# Patient Record
Sex: FEMALE | Employment: OTHER | URBAN - METROPOLITAN AREA
[De-identification: names, ages, dates, MRNs, and addresses within clinical notes are randomized per-mention and may not be internally consistent; named-entity substitution may affect disease eponyms.]

---

## 2023-10-05 PROBLEM — I50.22 CHRONIC SYSTOLIC CHF (CONGESTIVE HEART FAILURE), NYHA CLASS 1 (MULTI): Status: ACTIVE | Noted: 2023-10-05

## 2023-10-05 PROBLEM — I25.10 CORONARY ARTERY DISEASE: Status: ACTIVE | Noted: 2023-10-05

## 2023-10-05 PROBLEM — R05.8 PRODUCTIVE COUGH: Status: ACTIVE | Noted: 2023-10-05

## 2023-10-05 PROBLEM — I82.409 ACUTE DVT (DEEP VENOUS THROMBOSIS) (MULTI): Status: ACTIVE | Noted: 2023-10-05

## 2023-10-05 PROBLEM — S29.012A UPPER BACK STRAIN: Status: ACTIVE | Noted: 2023-10-05

## 2023-10-05 PROBLEM — G62.9 PN (PERIPHERAL NEUROPATHY): Status: ACTIVE | Noted: 2023-10-05

## 2023-10-05 PROBLEM — I25.2 OLD MYOCARDIAL INFARCTION: Status: ACTIVE | Noted: 2023-10-05

## 2023-10-05 PROBLEM — I46.9 CARDIAC ARREST WITH SUCCESSFUL RESUSCITATION (MULTI): Status: ACTIVE | Noted: 2023-10-05

## 2023-10-05 PROBLEM — R53.83 FATIGUE: Status: ACTIVE | Noted: 2023-10-05

## 2023-10-05 PROBLEM — I48.0 PAROXYSMAL ATRIAL FIBRILLATION (MULTI): Status: ACTIVE | Noted: 2023-10-05

## 2023-10-05 PROBLEM — I25.5 ISCHEMIC CARDIOMYOPATHY: Status: ACTIVE | Noted: 2023-10-05

## 2023-10-05 PROBLEM — M54.9 UPPER BACK PAIN: Status: ACTIVE | Noted: 2023-10-05

## 2023-10-05 PROBLEM — R07.89 CHEST TIGHTNESS: Status: ACTIVE | Noted: 2023-10-05

## 2023-10-05 PROBLEM — R06.09 EXERTIONAL DYSPNEA: Status: ACTIVE | Noted: 2023-10-05

## 2023-10-05 PROBLEM — E78.5 HYPERLIPIDEMIA: Status: ACTIVE | Noted: 2023-10-05

## 2023-10-05 RX ORDER — CLOPIDOGREL BISULFATE 75 MG/1
1 TABLET ORAL DAILY
COMMUNITY
Start: 2021-07-07 | End: 2023-12-06 | Stop reason: SDUPTHER

## 2023-10-05 RX ORDER — MONTELUKAST SODIUM 10 MG/1
1 TABLET ORAL DAILY
COMMUNITY

## 2023-10-05 RX ORDER — TORSEMIDE 5 MG/1
1 TABLET ORAL DAILY
COMMUNITY
Start: 2022-08-29 | End: 2024-05-03

## 2023-10-05 RX ORDER — ZINC SULFATE 50(220)MG
CAPSULE ORAL
COMMUNITY

## 2023-10-05 RX ORDER — OMEPRAZOLE 20 MG/1
20 CAPSULE, DELAYED RELEASE ORAL DAILY
COMMUNITY

## 2023-10-05 RX ORDER — FLUTICASONE PROPIONATE 50 MCG
SPRAY, SUSPENSION (ML) NASAL
COMMUNITY

## 2023-10-05 RX ORDER — METOPROLOL SUCCINATE 25 MG/1
1 TABLET, EXTENDED RELEASE ORAL DAILY
COMMUNITY
Start: 2022-04-28 | End: 2024-05-31

## 2023-10-05 RX ORDER — LOSARTAN POTASSIUM 25 MG/1
1 TABLET ORAL DAILY
COMMUNITY
Start: 2021-07-07 | End: 2024-06-10

## 2023-10-05 RX ORDER — SERTRALINE HYDROCHLORIDE 25 MG/1
1 TABLET, FILM COATED ORAL DAILY
COMMUNITY
Start: 2021-02-24 | End: 2024-04-10

## 2023-10-05 RX ORDER — ATORVASTATIN CALCIUM 40 MG/1
1 TABLET, FILM COATED ORAL DAILY
COMMUNITY
Start: 2021-07-07

## 2023-11-02 ENCOUNTER — OFFICE VISIT (OUTPATIENT)
Dept: CARDIOLOGY | Facility: CLINIC | Age: 68
End: 2023-11-02
Payer: MEDICARE

## 2023-11-02 ENCOUNTER — HOSPITAL ENCOUNTER (OUTPATIENT)
Dept: VASCULAR MEDICINE | Facility: CLINIC | Age: 68
Discharge: HOME | End: 2023-11-02
Payer: MEDICARE

## 2023-11-02 VITALS
BODY MASS INDEX: 28.85 KG/M2 | DIASTOLIC BLOOD PRESSURE: 60 MMHG | HEIGHT: 64 IN | OXYGEN SATURATION: 97 % | SYSTOLIC BLOOD PRESSURE: 100 MMHG | WEIGHT: 169 LBS | HEART RATE: 75 BPM

## 2023-11-02 DIAGNOSIS — I73.9 PERIPHERAL VASCULAR DISEASE, UNSPECIFIED (CMS-HCC): ICD-10-CM

## 2023-11-02 DIAGNOSIS — R06.09 EXERTIONAL DYSPNEA: ICD-10-CM

## 2023-11-02 DIAGNOSIS — E78.2 MIXED HYPERLIPIDEMIA: ICD-10-CM

## 2023-11-02 DIAGNOSIS — I46.9 CARDIAC ARREST WITH SUCCESSFUL RESUSCITATION (MULTI): Primary | ICD-10-CM

## 2023-11-02 DIAGNOSIS — R07.89 CHEST TIGHTNESS: ICD-10-CM

## 2023-11-02 DIAGNOSIS — I50.22 CHRONIC SYSTOLIC CHF (CONGESTIVE HEART FAILURE), NYHA CLASS 1 (MULTI): ICD-10-CM

## 2023-11-02 DIAGNOSIS — I25.10 CORONARY ARTERY DISEASE INVOLVING NATIVE CORONARY ARTERY OF NATIVE HEART WITHOUT ANGINA PECTORIS: ICD-10-CM

## 2023-11-02 DIAGNOSIS — I48.0 PAROXYSMAL ATRIAL FIBRILLATION (MULTI): ICD-10-CM

## 2023-11-02 DIAGNOSIS — I25.2 OLD MYOCARDIAL INFARCTION: ICD-10-CM

## 2023-11-02 DIAGNOSIS — I25.5 ISCHEMIC CARDIOMYOPATHY: ICD-10-CM

## 2023-11-02 PROCEDURE — 1036F TOBACCO NON-USER: CPT | Performed by: INTERNAL MEDICINE

## 2023-11-02 PROCEDURE — 93924 LWR XTR VASC STDY BILAT: CPT | Performed by: SURGERY

## 2023-11-02 PROCEDURE — 99214 OFFICE O/P EST MOD 30 MIN: CPT | Performed by: INTERNAL MEDICINE

## 2023-11-02 PROCEDURE — 93924 LWR XTR VASC STDY BILAT: CPT

## 2023-11-02 PROCEDURE — 1159F MED LIST DOCD IN RCRD: CPT | Performed by: INTERNAL MEDICINE

## 2023-11-02 RX ORDER — FLUTICASONE PROPIONATE AND SALMETEROL 100; 50 UG/1; UG/1
1 POWDER RESPIRATORY (INHALATION)
COMMUNITY

## 2023-11-02 RX ORDER — BENZONATATE 100 MG/1
100 CAPSULE ORAL DAILY
COMMUNITY

## 2023-11-02 ASSESSMENT — ENCOUNTER SYMPTOMS: DYSPNEA ON EXERTION: 1

## 2023-11-02 NOTE — PATIENT INSTRUCTIONS
We think that you have carpel tunnel syndrome.  See Dr. Domenic Cortes  [facility]. [phone]    Your vascular study is normal.

## 2023-11-02 NOTE — PROGRESS NOTES
Subjective   Mary Beth Serrato is a 68 y.o. female.    Chief Complaint:  Follow-up congestive heart failure, coronary artery disease.  Exertional dyspnea.    HPI    Over the past 6 months she has felt fairly well.  She notes shortness of breath and dyspnea with exertion.  Does not get any anginal symptoms.  Is quite vigorous and active.  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  Is active throughout the day.  Gets tightness in a band across the chest area.  This is relieved by removing her bra.  She has had no further emergency room visits for shortness of breath since her last visit.  She does complain of discomfort in her lower extremities.    The patient presented emergently on October 22, 2019 with an acute myocardial infarction. This was complicated by cardiac arrest and fairly prolonged resuscitation. She underwent angioplasty and stenting of the left anterior descending and circumflex coronary arteries. Both thrombotic occlusions. The procedures were done under Impella support. Follow-up echocardiographic studies demonstrated an ejection fraction of 35%. She had a slow prolonged postoperative course secondary to some neurologic issues and congestive heart failure and ventricular arrhythmias. At the time of her discharge she was started on beta blockers, angiotensin receptor blockers, and amiodarone. She also had paroxysmal atrial fibrillation. She had evidence of a deep venous thrombosis.     A nuclear stress thallium study in July 2020 showed an ejection fraction of 39%.     Her past cardiac history is significant for hypertension, hyperlipidemia, and a positive family history of heart issues. In 2014 she had a DVT.     Allergies  Medication    · No Known Drug Allergies   Recorded By: Celia Woods; 8/26/2016 4:58:20 PM     Family History  Mother    · Family history of congestive heart failure (V17.49) (Z82.49)  Maternal Grandmother    · Family history of cardiac disorder (V17.49) (Z82.49)     Social History  Problems  "   · Does not use illicit drugs (V49.89) (Z78.9)   · Never a smoker   · No alcohol use      Review of Systems   Cardiovascular:  Positive for dyspnea on exertion.   All other systems reviewed and are negative.      Visit Vitals  /60 (BP Location: Left arm, Patient Position: Sitting, BP Cuff Size: Adult)   Pulse 75   Ht 1.626 m (5' 4\")   Wt 76.7 kg (169 lb)   SpO2 97%   BMI 29.01 kg/m²   Smoking Status Never   BSA 1.86 m²        Objective     Constitutional:       Appearance: Not in distress.   Neck:      Vascular: JVD normal.   Pulmonary:      Breath sounds: Normal breath sounds.   Cardiovascular:      Normal rate. Regular rhythm. Normal S1. Normal S2.       Murmurs: There is no murmur.      No gallop.    Pulses:     Intact distal pulses.   Edema:     Peripheral edema absent.   Abdominal:      General: There is no distension.      Palpations: Abdomen is soft.   Neurological:      Mental Status: Alert.         Lab Review:     Assessment:    1.  Coronary artery disease.  Status post extensive myocardial infarction in October 2019.  She has had a remarkably good course.  Has not had recurrence of her symptoms.  Her most recent stress thallium study shows a left ventricular ejection fraction of 41% with fixed defects.  We will continue medical management.  The patient's chest tightness symptoms are atypical for angina.    2.  Hypertension.  Blood pressures are low.    3.  Hyperlipidemia.  Patient is on statin therapy.  She is on atorvastatin 40 mg daily.  Most recent LDL was 70.    4.  Systolic congestive heart failure.  No heart failure by exam.  Oxygen saturation is 98%.  Lungs are clear.    5.  Leg pains.  Felt to be claudication.  However PVR studies are normal.  "

## 2023-12-06 DIAGNOSIS — I25.10 CORONARY ARTERY DISEASE INVOLVING NATIVE CORONARY ARTERY OF NATIVE HEART WITHOUT ANGINA PECTORIS: Primary | ICD-10-CM

## 2023-12-07 RX ORDER — CLOPIDOGREL BISULFATE 75 MG/1
75 TABLET ORAL DAILY
Qty: 90 TABLET | Refills: 3 | Status: SHIPPED | OUTPATIENT
Start: 2023-12-07

## 2024-01-22 DIAGNOSIS — I48.0 PAROXYSMAL ATRIAL FIBRILLATION (MULTI): Primary | ICD-10-CM

## 2024-01-23 ENCOUNTER — TELEPHONE (OUTPATIENT)
Dept: CARDIOLOGY | Facility: CLINIC | Age: 69
End: 2024-01-23
Payer: MEDICARE

## 2024-01-23 DIAGNOSIS — I48.0 PAROXYSMAL ATRIAL FIBRILLATION (MULTI): Primary | ICD-10-CM

## 2024-01-23 NOTE — TELEPHONE ENCOUNTER
Pt called for refill for Xarelto 10mg daily and asking for samples because she doesn't have enough until mail order sends shipment. New rx to be sent to [facility] and will provide pt with Xarelto 10mg samples.

## 2024-01-24 DIAGNOSIS — I48.0 PAROXYSMAL ATRIAL FIBRILLATION (MULTI): ICD-10-CM

## 2024-04-10 DIAGNOSIS — F41.9 ANXIETY: ICD-10-CM

## 2024-04-10 RX ORDER — SERTRALINE HYDROCHLORIDE 25 MG/1
25 TABLET, FILM COATED ORAL DAILY
Qty: 90 TABLET | Refills: 1 | Status: SHIPPED | OUTPATIENT
Start: 2024-04-10

## 2024-04-23 PROBLEM — I83.813 VARICOSE VEINS OF BOTH LOWER EXTREMITIES WITH PAIN: Status: ACTIVE | Noted: 2024-04-23

## 2024-04-23 PROBLEM — I10 HYPERTENSION: Status: ACTIVE | Noted: 2024-04-23

## 2024-04-23 PROBLEM — M81.0 OSTEOPOROSIS: Status: ACTIVE | Noted: 2023-09-18

## 2024-04-23 PROBLEM — I26.99 PULMONARY EMBOLISM (MULTI): Status: ACTIVE | Noted: 2024-04-23

## 2024-04-23 PROBLEM — I25.2 HX OF MYOCARDIAL INFARCTION: Status: ACTIVE | Noted: 2024-04-23

## 2024-05-03 DIAGNOSIS — I10 PRIMARY HYPERTENSION: ICD-10-CM

## 2024-05-03 RX ORDER — TORSEMIDE 5 MG/1
5 TABLET ORAL DAILY
Qty: 90 TABLET | Refills: 3 | Status: SHIPPED | OUTPATIENT
Start: 2024-05-03

## 2024-05-15 ENCOUNTER — OFFICE VISIT (OUTPATIENT)
Dept: CARDIOLOGY | Facility: CLINIC | Age: 69
End: 2024-05-15
Payer: MEDICARE

## 2024-05-15 VITALS
HEART RATE: 83 BPM | DIASTOLIC BLOOD PRESSURE: 66 MMHG | HEIGHT: 64 IN | SYSTOLIC BLOOD PRESSURE: 126 MMHG | BODY MASS INDEX: 29.88 KG/M2 | OXYGEN SATURATION: 94 % | WEIGHT: 175 LBS

## 2024-05-15 DIAGNOSIS — E78.2 MIXED HYPERLIPIDEMIA: ICD-10-CM

## 2024-05-15 DIAGNOSIS — I83.813 VARICOSE VEINS OF BOTH LOWER EXTREMITIES WITH PAIN: ICD-10-CM

## 2024-05-15 DIAGNOSIS — I50.22 CHRONIC SYSTOLIC CHF (CONGESTIVE HEART FAILURE), NYHA CLASS 1 (MULTI): ICD-10-CM

## 2024-05-15 DIAGNOSIS — I48.0 PAROXYSMAL ATRIAL FIBRILLATION (MULTI): Primary | ICD-10-CM

## 2024-05-15 DIAGNOSIS — I46.9 CARDIAC ARREST WITH SUCCESSFUL RESUSCITATION (MULTI): ICD-10-CM

## 2024-05-15 DIAGNOSIS — I25.2 OLD MYOCARDIAL INFARCTION: ICD-10-CM

## 2024-05-15 DIAGNOSIS — I25.5 ISCHEMIC CARDIOMYOPATHY: ICD-10-CM

## 2024-05-15 DIAGNOSIS — I25.10 CORONARY ARTERY DISEASE INVOLVING NATIVE CORONARY ARTERY OF NATIVE HEART WITHOUT ANGINA PECTORIS: ICD-10-CM

## 2024-05-15 DIAGNOSIS — R06.09 EXERTIONAL DYSPNEA: ICD-10-CM

## 2024-05-15 DIAGNOSIS — I10 PRIMARY HYPERTENSION: ICD-10-CM

## 2024-05-15 PROBLEM — R07.89 CHEST TIGHTNESS: Status: RESOLVED | Noted: 2023-10-05 | Resolved: 2024-05-15

## 2024-05-15 PROCEDURE — 1036F TOBACCO NON-USER: CPT | Performed by: INTERNAL MEDICINE

## 2024-05-15 PROCEDURE — 3074F SYST BP LT 130 MM HG: CPT | Performed by: INTERNAL MEDICINE

## 2024-05-15 PROCEDURE — 99214 OFFICE O/P EST MOD 30 MIN: CPT | Performed by: INTERNAL MEDICINE

## 2024-05-15 PROCEDURE — 3078F DIAST BP <80 MM HG: CPT | Performed by: INTERNAL MEDICINE

## 2024-05-15 PROCEDURE — 1159F MED LIST DOCD IN RCRD: CPT | Performed by: INTERNAL MEDICINE

## 2024-05-15 PROCEDURE — 93000 ELECTROCARDIOGRAM COMPLETE: CPT | Performed by: INTERNAL MEDICINE

## 2024-05-15 RX ORDER — ROSUVASTATIN CALCIUM 40 MG/1
40 TABLET, COATED ORAL DAILY
Qty: 90 TABLET | Refills: 3 | Status: SHIPPED | OUTPATIENT
Start: 2024-05-15 | End: 2025-05-15

## 2024-05-15 ASSESSMENT — ENCOUNTER SYMPTOMS: DYSPNEA ON EXERTION: 1

## 2024-05-15 NOTE — PROGRESS NOTES
Subjective   Mary Beth Serrato is a 68 y.o. female.    Chief Complaint:  Follow-up coronary artery disease.    HPI    She continues to be very active.  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  The bandlike sensation that she previously described is no longer present.  Also followed by primary care and followed by pulmonary service.  On anticoagulation therapy because of a history of DVT.  No further medical episodes or emergency room visits.    The patient presented emergently on October 22, 2019 with an acute myocardial infarction. This was complicated by cardiac arrest and fairly prolonged resuscitation. She underwent angioplasty and stenting of the left anterior descending and circumflex coronary arteries. Both thrombotic occlusions. The procedures were done under Impella support. Follow-up echocardiographic studies demonstrated an ejection fraction of 35%. She had a slow prolonged postoperative course secondary to some neurologic issues and congestive heart failure and ventricular arrhythmias. At the time of her discharge she was started on beta blockers, angiotensin receptor blockers, and amiodarone. She also had paroxysmal atrial fibrillation. She had evidence of a deep venous thrombosis.     A nuclear stress thallium study in July 2020 showed an ejection fraction of 39%.     Her past cardiac history is significant for hypertension, hyperlipidemia, and a positive family history of heart issues. In 2014 she had a DVT.      Allergies  Medication    · No Known Drug Allergies   Recorded By: Celia Woods; 8/26/2016 4:58:20 PM     Family History  Mother    · Family history of congestive heart failure (V17.49) (Z82.49)  Maternal Grandmother    · Family history of cardiac disorder (V17.49) (Z82.49)     Social History  Problems    · Does not use illicit drugs (V49.89) (Z78.9)   · Never a smoker   · No alcohol use    Review of Systems   Cardiovascular:  Positive for dyspnea on exertion.   All other systems reviewed  "and are negative.      Current Outpatient Medications   Medication Sig Dispense Refill    atorvastatin (Lipitor) 40 mg tablet Take 1 tablet (40 mg) by mouth once daily.      cholecalciferol, vitamin D3, (VITAMIN D3 ORAL) Take by mouth.      clopidogrel (Plavix) 75 mg tablet Take 1 tablet (75 mg) by mouth once daily. 90 tablet 3    fluticasone (Flonase) 50 mcg/actuation nasal spray Administer into affected nostril(s).      fluticasone propion-salmeteroL (Advair Diskus) 100-50 mcg/dose diskus inhaler Inhale 1 puff 2 times a day. Rinse mouth with water after use to reduce aftertaste and incidence of candidiasis. Do not swallow.      glutamine 500 mg capsule Take by mouth.      losartan (Cozaar) 25 mg tablet Take 1 tablet (25 mg) by mouth once daily.      metoprolol succinate XL (Toprol-XL) 25 mg 24 hr tablet Take 1 tablet (25 mg) by mouth once daily.      montelukast (Singulair) 10 mg tablet Take 1 tablet (10 mg) by mouth once daily.      omeprazole (PriLOSEC) 20 mg DR capsule Take 20 capsules (400 mg) by mouth once daily.      rivaroxaban (Xarelto) 10 mg tablet Take 1 tablet (10 mg) by mouth once daily. 90 tablet 3    sertraline (Zoloft) 25 mg tablet TAKE 1 TABLET DAILY 90 tablet 1    torsemide (Demadex) 5 mg tablet TAKE 1 TABLET DAILY 90 tablet 3    zinc sulfate (Zincate) 220 (50 Zn) MG capsule Take by mouth.      benzonatate (Tessalon) 100 mg capsule Take 1 capsule (100 mg) by mouth once daily. Do not crush or chew.      DENOSUMAB SUBQ Inject under the skin.      rosuvastatin (Crestor) 40 mg tablet Take 1 tablet (40 mg) by mouth once daily. 90 tablet 3     No current facility-administered medications for this visit.        Visit Vitals  /66 (BP Location: Left arm)   Pulse 83   Ht 1.626 m (5' 4\")   Wt 79.4 kg (175 lb)   SpO2 94%   BMI 30.04 kg/m²   Smoking Status Never   BSA 1.89 m²        Objective     Constitutional:       Appearance: Not in distress.   Neck:      Vascular: JVD normal.   Pulmonary:      " Breath sounds: Normal breath sounds.   Cardiovascular:      Normal rate. Regular rhythm. Normal S1. Normal S2.       Murmurs: There is no murmur.      No gallop.    Pulses:     Intact distal pulses.   Edema:     Peripheral edema absent.   Abdominal:      General: There is no distension.      Palpations: Abdomen is soft.   Neurological:      Mental Status: Alert.       Assessment:    1.  Coronary disease.  Status post angioplasty and stent placement in October 2019.  Procedure was complicated by cardiac arrest.  Has some slight discomfort in the upper sternal area which is reproducible with palpation.  Latest stress time study was negative for ischemia.    2.  Systolic congestive heart failure.  Ejection fraction of around 40%.  No heart failure symptoms.  No heart failure by exam.  Oxygen saturation is normal at 98%.  She is on a combination of beta-blocker torsemide and losartan.    3.  Hyperlipidemia.  Most recent labs show a cholesterol 182, HDL 49, LDL 90.  We are going to change her atorvastatin to rosuvastatin.    4.  History of DVT.  On maintenance Xarelto therapy.

## 2024-05-15 NOTE — PATIENT INSTRUCTIONS
Stop atorvastatin 40 mg daily    Start Rosuvastatin 40 mg daily    No changes in your other medications.    Discontinue fish oil  Discontinue Zinc.

## 2024-05-31 DIAGNOSIS — I10 PRIMARY HYPERTENSION: ICD-10-CM

## 2024-05-31 RX ORDER — METOPROLOL SUCCINATE 25 MG/1
25 TABLET, EXTENDED RELEASE ORAL DAILY
Qty: 90 TABLET | Refills: 3 | Status: SHIPPED | OUTPATIENT
Start: 2024-05-31

## 2024-06-08 DIAGNOSIS — I10 PRIMARY HYPERTENSION: ICD-10-CM

## 2024-06-10 RX ORDER — LOSARTAN POTASSIUM 25 MG/1
25 TABLET ORAL DAILY
Qty: 90 TABLET | Refills: 3 | Status: SHIPPED | OUTPATIENT
Start: 2024-06-10

## 2024-08-23 DIAGNOSIS — F41.9 ANXIETY: ICD-10-CM

## 2024-08-23 RX ORDER — SERTRALINE HYDROCHLORIDE 25 MG/1
25 TABLET, FILM COATED ORAL DAILY
Qty: 90 TABLET | Refills: 2 | Status: SHIPPED | OUTPATIENT
Start: 2024-08-23

## 2024-10-21 DIAGNOSIS — I25.10 CORONARY ARTERY DISEASE INVOLVING NATIVE CORONARY ARTERY OF NATIVE HEART WITHOUT ANGINA PECTORIS: ICD-10-CM

## 2024-10-21 RX ORDER — CLOPIDOGREL BISULFATE 75 MG/1
75 TABLET ORAL DAILY
Qty: 90 TABLET | Refills: 3 | Status: SHIPPED | OUTPATIENT
Start: 2024-10-21

## 2024-10-30 PROBLEM — M19.041 ARTHRITIS OF BOTH HANDS: Status: ACTIVE | Noted: 2024-10-30

## 2024-10-30 PROBLEM — M19.042 ARTHRITIS OF BOTH HANDS: Status: ACTIVE | Noted: 2024-10-30

## 2024-11-18 PROBLEM — I25.2 OLD MYOCARDIAL INFARCTION: Status: RESOLVED | Noted: 2023-10-05 | Resolved: 2024-11-18

## 2024-11-20 ENCOUNTER — APPOINTMENT (OUTPATIENT)
Dept: CARDIOLOGY | Facility: CLINIC | Age: 69
End: 2024-11-20
Payer: MEDICARE

## 2024-11-20 VITALS
OXYGEN SATURATION: 94 % | WEIGHT: 173 LBS | RESPIRATION RATE: 16 BRPM | SYSTOLIC BLOOD PRESSURE: 122 MMHG | BODY MASS INDEX: 29.7 KG/M2 | HEART RATE: 58 BPM | DIASTOLIC BLOOD PRESSURE: 60 MMHG

## 2024-11-20 DIAGNOSIS — E78.2 MIXED HYPERLIPIDEMIA: Primary | ICD-10-CM

## 2024-11-20 DIAGNOSIS — J45.20 MILD INTERMITTENT ASTHMA WITHOUT COMPLICATION (HHS-HCC): ICD-10-CM

## 2024-11-20 DIAGNOSIS — I83.813 VARICOSE VEINS OF BOTH LOWER EXTREMITIES WITH PAIN: ICD-10-CM

## 2024-11-20 DIAGNOSIS — I25.5 ISCHEMIC CARDIOMYOPATHY: ICD-10-CM

## 2024-11-20 DIAGNOSIS — R06.09 EXERTIONAL DYSPNEA: ICD-10-CM

## 2024-11-20 DIAGNOSIS — I46.9 CARDIAC ARREST WITH SUCCESSFUL RESUSCITATION: ICD-10-CM

## 2024-11-20 DIAGNOSIS — I10 PRIMARY HYPERTENSION: ICD-10-CM

## 2024-11-20 DIAGNOSIS — I25.10 CORONARY ARTERY DISEASE INVOLVING NATIVE CORONARY ARTERY OF NATIVE HEART WITHOUT ANGINA PECTORIS: ICD-10-CM

## 2024-11-20 DIAGNOSIS — I27.82 CHRONIC PULMONARY EMBOLISM WITHOUT ACUTE COR PULMONALE, UNSPECIFIED PULMONARY EMBOLISM TYPE (MULTI): ICD-10-CM

## 2024-11-20 DIAGNOSIS — I25.2 HX OF MYOCARDIAL INFARCTION: ICD-10-CM

## 2024-11-20 DIAGNOSIS — I50.22 CHRONIC SYSTOLIC CHF (CONGESTIVE HEART FAILURE), NYHA CLASS 1: ICD-10-CM

## 2024-11-20 DIAGNOSIS — I48.0 PAROXYSMAL ATRIAL FIBRILLATION (MULTI): ICD-10-CM

## 2024-11-20 PROCEDURE — 3078F DIAST BP <80 MM HG: CPT | Performed by: INTERNAL MEDICINE

## 2024-11-20 PROCEDURE — 3074F SYST BP LT 130 MM HG: CPT | Performed by: INTERNAL MEDICINE

## 2024-11-20 PROCEDURE — 1159F MED LIST DOCD IN RCRD: CPT | Performed by: INTERNAL MEDICINE

## 2024-11-20 PROCEDURE — 99214 OFFICE O/P EST MOD 30 MIN: CPT | Performed by: INTERNAL MEDICINE

## 2024-11-20 RX ORDER — IBANDRONATE SODIUM 150 MG/1
150 TABLET, FILM COATED ORAL
COMMUNITY
Start: 2024-09-09

## 2024-11-20 RX ORDER — EZETIMIBE 10 MG/1
10 TABLET ORAL DAILY
Qty: 90 TABLET | Refills: 3 | Status: SHIPPED | OUTPATIENT
Start: 2024-11-20 | End: 2025-11-20

## 2024-11-20 RX ORDER — MONTELUKAST SODIUM 10 MG/1
10 TABLET ORAL DAILY
Qty: 90 TABLET | Refills: 3 | Status: SHIPPED | OUTPATIENT
Start: 2024-11-20 | End: 2025-11-20

## 2024-11-20 NOTE — PROGRESS NOTES
Subjective   Mary Beth Serrato is a 69 y.o. female.    Chief Complaint:  Follow-up coronary disease, hypertension, hyperlipidemia.    HPI    She has had no cardiac symptoms or issues since her last visit.  Denies any chest pain or shortness of breath.  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  Has a history of DVT.  History of some arthritis issues seen by the orthopedic surgery service.    The patient presented emergently on October 22, 2019 with an acute myocardial infarction. This was complicated by cardiac arrest and fairly prolonged resuscitation. She underwent angioplasty and stenting of the left anterior descending and circumflex coronary arteries. Both thrombotic occlusions. The procedures were done under Impella support. Follow-up echocardiographic studies demonstrated an ejection fraction of 35%. She had a slow prolonged postoperative course secondary to some neurologic issues and congestive heart failure and ventricular arrhythmias. At the time of her discharge she was started on beta blockers, angiotensin receptor blockers, and amiodarone. She also had paroxysmal atrial fibrillation. She had evidence of a deep venous thrombosis.     A nuclear stress thallium study in July 2020 showed an ejection fraction of 39%.     Her past cardiac history is significant for hypertension, hyperlipidemia, and a positive family history of heart issues. In 2014 she had a DVT.      Allergies  Medication    · No Known Drug Allergies   Recorded By: Celia Woods; 8/26/2016 4:58:20 PM     Family History  Mother    · Family history of congestive heart failure (V17.49) (Z82.49)  Maternal Grandmother    · Family history of cardiac disorder (V17.49) (Z82.49)     Social History  Problems    · Never a smoker   · No alcohol use     Review of Systems   Cardiovascular:  Positive for dyspnea on exertion.   All other systems reviewed and are negative.    Current Outpatient Medications   Medication Sig Dispense  Refill    atorvastatin (Lipitor) 40 mg tablet Take 1 tablet (40 mg) by mouth once daily.      cholecalciferol, vitamin D3, (VITAMIN D3 ORAL) Take by mouth.      clopidogrel (Plavix) 75 mg tablet TAKE 1 TABLET DAILY 90 tablet 3    fluticasone (Flonase) 50 mcg/actuation nasal spray Administer into affected nostril(s).      fluticasone propion-salmeteroL (Advair Diskus) 100-50 mcg/dose diskus inhaler Inhale 1 puff 2 times a day. Rinse mouth with water after use to reduce aftertaste and incidence of candidiasis. Do not swallow.      glutamine 500 mg capsule Take by mouth.      ibandronate (Boniva) 150 mg tablet Take 1 tablet (150 mg) by mouth.      losartan (Cozaar) 25 mg tablet TAKE 1 TABLET DAILY 90 tablet 3    metoprolol succinate XL (Toprol-XL) 25 mg 24 hr tablet TAKE 1 TABLET DAILY 90 tablet 3    omeprazole (PriLOSEC) 20 mg DR capsule Take 20 capsules (400 mg) by mouth once daily.      rosuvastatin (Crestor) 40 mg tablet Take 1 tablet (40 mg) by mouth once daily. 90 tablet 3    sertraline (Zoloft) 25 mg tablet TAKE ONE TABLET BY MOUTH EVERY DAY 90 tablet 2    torsemide (Demadex) 5 mg tablet TAKE 1 TABLET DAILY 90 tablet 3    zinc sulfate (Zincate) 220 (50 Zn) MG capsule Take by mouth.      benzonatate (Tessalon) 100 mg capsule Take 1 capsule (100 mg) by mouth once daily. Do not crush or chew.      DENOSUMAB SUBQ Inject under the skin.      ezetimibe (Zetia) 10 mg tablet Take 1 tablet (10 mg) by mouth once daily. 90 tablet 3    montelukast (Singulair) 10 mg tablet Take 1 tablet (10 mg) by mouth once daily. 90 tablet 3    rivaroxaban (Xarelto) 10 mg tablet Take 1 tablet (10 mg) by mouth once daily. 90 tablet 3     No current facility-administered medications for this visit.        Visit Vitals  /60   Pulse 58   Resp 16   Wt 78.5 kg (173 lb)   SpO2 94%   BMI 29.70 kg/m²   Smoking Status Never   BSA 1.88 m²        Objective     Constitutional:       Appearance: Not in distress.   Neck:      Vascular: JVD normal.    Pulmonary:      Breath sounds: Normal breath sounds.   Cardiovascular:      Normal rate. Regular rhythm. Normal S1. Normal S2.       Murmurs: There is no murmur.      No gallop.    Pulses:     Intact distal pulses.   Edema:     Peripheral edema absent.   Abdominal:      General: There is no distension.      Palpations: Abdomen is soft.   Neurological:      Mental Status: Alert.       Assessment:    1.  Coronary artery disease.  Status post angioplasty and stenting in 2019.  Since that time she has made modest improvement in left ventricular function.  Most recent echo studies show an ejection fraction of about 40% and this is remained unchanged.  She is on losartan and metoprolol.  Doubt that we will see any improvement in the future as she has had some permanent scarring as a result of the acute coronary event.  She does remarkably well despite a decreased left ventricular function.    2.  Hypertension.  Blood pressures are normal.    3.  Hyperlipidemia.  Cholesterol is 182, HDL 49, LDL 90.  Will add Zetia 10 mg daily.

## 2025-01-02 DIAGNOSIS — I48.0 PAROXYSMAL ATRIAL FIBRILLATION (MULTI): Primary | ICD-10-CM

## 2025-01-02 NOTE — TELEPHONE ENCOUNTER
Pt called for refill on Xarelto 10mg daily. Pt states the pharmacy informed her rx was cancelled. Informed pt that we did not cancel rx, a refill was sent to [facility] on 11/20/24. Informed pt that I will have Dr. Galvan sign a new rx and then it will go to [facility]. Pt verbalizes understanding.

## 2025-03-16 DIAGNOSIS — E78.2 MIXED HYPERLIPIDEMIA: ICD-10-CM

## 2025-03-17 RX ORDER — ROSUVASTATIN CALCIUM 40 MG/1
40 TABLET, COATED ORAL DAILY
Qty: 90 TABLET | Refills: 3 | Status: SHIPPED | OUTPATIENT
Start: 2025-03-17

## 2025-03-28 ENCOUNTER — HOSPITAL ENCOUNTER (OUTPATIENT)
Dept: CARDIOLOGY | Facility: CLINIC | Age: 70
Discharge: HOME | End: 2025-03-28
Payer: MEDICARE

## 2025-03-28 ENCOUNTER — OFFICE VISIT (OUTPATIENT)
Dept: CARDIOLOGY | Facility: CLINIC | Age: 70
End: 2025-03-28
Payer: MEDICARE

## 2025-03-28 VITALS
HEART RATE: 82 BPM | SYSTOLIC BLOOD PRESSURE: 118 MMHG | DIASTOLIC BLOOD PRESSURE: 76 MMHG | WEIGHT: 174 LBS | OXYGEN SATURATION: 93 % | BODY MASS INDEX: 29.87 KG/M2

## 2025-03-28 DIAGNOSIS — I25.10 CORONARY ARTERY DISEASE INVOLVING NATIVE CORONARY ARTERY OF NATIVE HEART WITHOUT ANGINA PECTORIS: ICD-10-CM

## 2025-03-28 DIAGNOSIS — R06.09 EXERTIONAL DYSPNEA: ICD-10-CM

## 2025-03-28 DIAGNOSIS — I48.0 PAROXYSMAL ATRIAL FIBRILLATION (MULTI): ICD-10-CM

## 2025-03-28 DIAGNOSIS — I46.9 CARDIAC ARREST WITH SUCCESSFUL RESUSCITATION: ICD-10-CM

## 2025-03-28 DIAGNOSIS — E78.2 MIXED HYPERLIPIDEMIA: ICD-10-CM

## 2025-03-28 DIAGNOSIS — I50.22 CHRONIC SYSTOLIC CHF (CONGESTIVE HEART FAILURE), NYHA CLASS 1: ICD-10-CM

## 2025-03-28 DIAGNOSIS — I10 PRIMARY HYPERTENSION: ICD-10-CM

## 2025-03-28 DIAGNOSIS — I25.5 ISCHEMIC CARDIOMYOPATHY: Primary | ICD-10-CM

## 2025-03-28 DIAGNOSIS — I25.2 HX OF MYOCARDIAL INFARCTION: ICD-10-CM

## 2025-03-28 PROCEDURE — C8929 TTE W OR WO FOL WCON,DOPPLER: HCPCS

## 2025-03-28 PROCEDURE — 1159F MED LIST DOCD IN RCRD: CPT | Performed by: INTERNAL MEDICINE

## 2025-03-28 PROCEDURE — 1036F TOBACCO NON-USER: CPT | Performed by: INTERNAL MEDICINE

## 2025-03-28 PROCEDURE — 2500000004 HC RX 250 GENERAL PHARMACY W/ HCPCS (ALT 636 FOR OP/ED): Performed by: INTERNAL MEDICINE

## 2025-03-28 PROCEDURE — 3074F SYST BP LT 130 MM HG: CPT | Performed by: INTERNAL MEDICINE

## 2025-03-28 PROCEDURE — 99214 OFFICE O/P EST MOD 30 MIN: CPT | Performed by: INTERNAL MEDICINE

## 2025-03-28 PROCEDURE — 3078F DIAST BP <80 MM HG: CPT | Performed by: INTERNAL MEDICINE

## 2025-03-28 RX ORDER — ROSUVASTATIN CALCIUM 40 MG/1
40 TABLET, COATED ORAL DAILY
Qty: 90 TABLET | Refills: 3 | Status: SHIPPED | OUTPATIENT
Start: 2025-03-28

## 2025-03-28 RX ORDER — CLOPIDOGREL BISULFATE 75 MG/1
75 TABLET ORAL DAILY
Qty: 90 TABLET | Refills: 3 | Status: SHIPPED | OUTPATIENT
Start: 2025-03-28

## 2025-03-28 RX ORDER — METOPROLOL SUCCINATE 25 MG/1
25 TABLET, EXTENDED RELEASE ORAL DAILY
Qty: 90 TABLET | Refills: 3 | Status: SHIPPED | OUTPATIENT
Start: 2025-03-28

## 2025-03-28 RX ORDER — LOSARTAN POTASSIUM 25 MG/1
25 TABLET ORAL DAILY
Qty: 90 TABLET | Refills: 3 | Status: SHIPPED | OUTPATIENT
Start: 2025-03-28

## 2025-03-28 RX ORDER — EZETIMIBE 10 MG/1
10 TABLET ORAL DAILY
Qty: 90 TABLET | Refills: 3 | Status: SHIPPED | OUTPATIENT
Start: 2025-03-28 | End: 2026-03-28

## 2025-03-28 RX ORDER — TORSEMIDE 5 MG/1
5 TABLET ORAL DAILY
Qty: 90 TABLET | Refills: 3 | Status: SHIPPED | OUTPATIENT
Start: 2025-03-28

## 2025-03-28 RX ADMIN — PERFLUTREN 3 ML OF DILUTION: 6.52 INJECTION, SUSPENSION INTRAVENOUS at 11:38

## 2025-03-28 ASSESSMENT — ENCOUNTER SYMPTOMS: DYSPNEA ON EXERTION: 1

## 2025-03-28 NOTE — PROGRESS NOTES
Subjective   Mary Beth Serrato is a 69 y.o. female.    Chief Complaint:  Follow-up coronary disease, congestive heart failure.    HPI    She notes exertional dyspnea.  No typical anginal symptoms.  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  No problems on her medications and claims compliance with her medications.    The patient presented emergently on October 22, 2019 with an acute myocardial infarction. This was complicated by cardiac arrest and fairly prolonged resuscitation. She underwent angioplasty and stenting of the left anterior descending and circumflex coronary arteries. Both thrombotic occlusions. The procedures were done under Impella support. Follow-up echocardiographic studies demonstrated an ejection fraction of 35%. She had a slow prolonged postoperative course secondary to some neurologic issues and congestive heart failure and ventricular arrhythmias.    A nuclear stress thallium study in July 2020 showed an ejection fraction of 39%.  She had paroxysmal atrial fibrillation in the DVT.     Her past cardiac history is significant for hypertension, hyperlipidemia, and a positive family history of heart issues. In 2014 she had a DVT.      Allergies  Medication    · No Known Drug AllergiesRecorded By: Celia Woods; 8/26/2016 4:58:20 PM     Family History  Mother    · Family history of congestive heart failure (V17.49) (Z82.49)  Maternal Grandmother    · Family history of cardiac disorder (V17.49) (Z82.49)     Social History  Problems    · Never a smoker   · No alcohol use     Review of Systems   Cardiovascular:  Positive for dyspnea on exertion.   Musculoskeletal:  Positive for arthritis.       Current Outpatient Medications   Medication Sig Dispense Refill    cholecalciferol, vitamin D3, (VITAMIN D3 ORAL) Take by mouth.      fluticasone (Flonase) 50 mcg/actuation nasal spray Administer into affected nostril(s).      fluticasone propion-salmeteroL (Advair Diskus) 100-50 mcg/dose diskus inhaler Inhale 1  puff 2 times a day. Rinse mouth with water after use to reduce aftertaste and incidence of candidiasis. Do not swallow.      glutamine 500 mg capsule Take by mouth.      ibandronate (Boniva) 150 mg tablet Take 1 tablet (150 mg) by mouth.      montelukast (Singulair) 10 mg tablet Take 1 tablet (10 mg) by mouth once daily. 90 tablet 3    omeprazole (PriLOSEC) 20 mg DR capsule Take 20 capsules (400 mg) by mouth once daily.      sertraline (Zoloft) 25 mg tablet TAKE ONE TABLET BY MOUTH EVERY DAY 90 tablet 2    zinc sulfate (Zincate) 220 (50 Zn) MG capsule Take by mouth.      clopidogrel (Plavix) 75 mg tablet Take 1 tablet (75 mg) by mouth once daily. 90 tablet 3    ezetimibe (Zetia) 10 mg tablet Take 1 tablet (10 mg) by mouth once daily. 90 tablet 3    losartan (Cozaar) 25 mg tablet Take 1 tablet (25 mg) by mouth once daily. 90 tablet 3    metoprolol succinate XL (Toprol-XL) 25 mg 24 hr tablet Take 1 tablet (25 mg) by mouth once daily. 90 tablet 3    rivaroxaban (Xarelto) 10 mg tablet Take 1 tablet (10 mg) by mouth once daily. 90 tablet 3    rosuvastatin (Crestor) 40 mg tablet Take 1 tablet (40 mg) by mouth once daily. 90 tablet 3    torsemide (Demadex) 5 mg tablet Take 1 tablet (5 mg) by mouth once daily. 90 tablet 3     No current facility-administered medications for this visit.        Visit Vitals  /76 (BP Location: Left arm, Patient Position: Sitting)   Pulse 82   Wt 78.9 kg (174 lb)   SpO2 93%   BMI 29.87 kg/m²   Smoking Status Never   BSA 1.89 m²        Objective     Constitutional:       Appearance: Not in distress.   Neck:      Vascular: JVD normal.   Pulmonary:      Breath sounds: Normal breath sounds.   Cardiovascular:      Normal rate. Regular rhythm. Normal S1. Normal S2.       Murmurs: There is no murmur.      No gallop.    Pulses:     Intact distal pulses.   Edema:     Peripheral edema absent.   Abdominal:      General: There is no distension.      Palpations: Abdomen is soft.   Neurological:       Mental Status: Alert.       Assessment:    1.  Coronary disease.  Status post angioplasty and stenting of the left anterior descending and the circumflex coronary arteries.  No anginal symptoms.  Reasonably good activity levels particularly when she takes care of small children.    2.  Hypertension.  Blood pressures are low.    3.  Hyperlipidemia.  Followed by primary care.  We will get her latest laboratory data from her primary care physician.    4.  Systolic congestive heart failure.  On today's echo study ejection fraction remains about 40%.  This is unchanged from prior studies.  We would not anticipate any significant improvement in left ventricular function because of her myocardial infarction.    5.  History of DVT.  On maintenance Xarelto therapy.  No bleeding issues.

## 2025-03-31 LAB
AORTIC VALVE MEAN GRADIENT: 3 MMHG
AORTIC VALVE PEAK VELOCITY: 1.27 M/S
AV PEAK GRADIENT: 6 MMHG
AVA (PEAK VEL): 2.32 CM2
AVA (VTI): 2.33 CM2
EJECTION FRACTION APICAL 4 CHAMBER: 49.1
EJECTION FRACTION: 48 %
LEFT ATRIUM VOLUME AREA LENGTH INDEX BSA: 35.7 ML/M2
LEFT VENTRICLE INTERNAL DIMENSION DIASTOLE: 5.06 CM (ref 3.5–6)
LEFT VENTRICULAR OUTFLOW TRACT DIAMETER: 2.03 CM
RIGHT VENTRICLE FREE WALL PEAK S': 0.08 CM/S
TRICUSPID ANNULAR PLANE SYSTOLIC EXCURSION: 1.9 CM

## 2025-04-30 DIAGNOSIS — F41.9 ANXIETY: ICD-10-CM

## 2025-04-30 RX ORDER — SERTRALINE HYDROCHLORIDE 25 MG/1
25 TABLET, FILM COATED ORAL DAILY
Qty: 90 TABLET | Refills: 2 | Status: SHIPPED | OUTPATIENT
Start: 2025-04-30

## 2025-09-30 ENCOUNTER — APPOINTMENT (OUTPATIENT)
Dept: CARDIOLOGY | Facility: CLINIC | Age: 70
End: 2025-09-30
Payer: MEDICARE